# Patient Record
Sex: FEMALE | Race: WHITE | Employment: PART TIME | URBAN - METROPOLITAN AREA
[De-identification: names, ages, dates, MRNs, and addresses within clinical notes are randomized per-mention and may not be internally consistent; named-entity substitution may affect disease eponyms.]

---

## 2017-02-22 ENCOUNTER — TELEPHONE (OUTPATIENT)
Dept: ORTHOPEDIC SURGERY | Age: 57
End: 2017-02-22

## 2017-03-01 ENCOUNTER — OFFICE VISIT (OUTPATIENT)
Dept: ORTHOPEDIC SURGERY | Age: 57
End: 2017-03-01

## 2017-03-01 VITALS — BODY MASS INDEX: 29.57 KG/M2 | HEIGHT: 68 IN | WEIGHT: 195.11 LBS

## 2017-03-01 DIAGNOSIS — M17.12 PRIMARY OSTEOARTHRITIS OF LEFT KNEE: Primary | ICD-10-CM

## 2017-03-01 DIAGNOSIS — M25.562 PAIN, JOINT, KNEE, LEFT: ICD-10-CM

## 2017-03-01 PROCEDURE — 99203 OFFICE O/P NEW LOW 30 MIN: CPT | Performed by: FAMILY MEDICINE

## 2017-03-01 PROCEDURE — 20610 DRAIN/INJ JOINT/BURSA W/O US: CPT | Performed by: FAMILY MEDICINE

## 2017-03-01 RX ORDER — MELOXICAM 15 MG/1
15 TABLET ORAL DAILY
Qty: 30 TABLET | Refills: 3 | Status: SHIPPED | OUTPATIENT
Start: 2017-03-01 | End: 2017-10-19 | Stop reason: SDUPTHER

## 2017-03-06 ENCOUNTER — OFFICE VISIT (OUTPATIENT)
Dept: ORTHOPEDIC SURGERY | Age: 57
End: 2017-03-06

## 2017-03-06 VITALS — BODY MASS INDEX: 29.57 KG/M2 | WEIGHT: 195.11 LBS | HEIGHT: 68 IN

## 2017-03-06 DIAGNOSIS — M25.562 PAIN, JOINT, KNEE, LEFT: ICD-10-CM

## 2017-03-06 DIAGNOSIS — M17.12 PRIMARY OSTEOARTHRITIS OF LEFT KNEE: Primary | ICD-10-CM

## 2017-03-06 PROCEDURE — 20610 DRAIN/INJ JOINT/BURSA W/O US: CPT | Performed by: FAMILY MEDICINE

## 2017-03-20 ENCOUNTER — OFFICE VISIT (OUTPATIENT)
Dept: ORTHOPEDIC SURGERY | Age: 57
End: 2017-03-20

## 2017-03-20 VITALS — HEIGHT: 68 IN | WEIGHT: 195.11 LBS | BODY MASS INDEX: 29.57 KG/M2

## 2017-03-20 DIAGNOSIS — M17.12 PRIMARY OSTEOARTHRITIS OF LEFT KNEE: Primary | ICD-10-CM

## 2017-03-20 DIAGNOSIS — M25.562 PAIN, JOINT, KNEE, LEFT: ICD-10-CM

## 2017-03-20 PROCEDURE — 20610 DRAIN/INJ JOINT/BURSA W/O US: CPT | Performed by: FAMILY MEDICINE

## 2017-06-12 ENCOUNTER — OFFICE VISIT (OUTPATIENT)
Dept: ORTHOPEDIC SURGERY | Age: 57
End: 2017-06-12

## 2017-06-12 VITALS — HEIGHT: 68 IN | WEIGHT: 195.11 LBS | BODY MASS INDEX: 29.57 KG/M2

## 2017-06-12 DIAGNOSIS — M25.562 PAIN, JOINT, KNEE, LEFT: ICD-10-CM

## 2017-06-12 DIAGNOSIS — M17.11 PRIMARY OSTEOARTHRITIS OF RIGHT KNEE: ICD-10-CM

## 2017-06-12 DIAGNOSIS — M25.561 ACUTE PAIN OF RIGHT KNEE: Primary | ICD-10-CM

## 2017-06-12 PROBLEM — M17.12 PRIMARY OSTEOARTHRITIS OF LEFT KNEE: Chronic | Status: ACTIVE | Noted: 2017-03-01

## 2017-06-12 PROCEDURE — 73560 X-RAY EXAM OF KNEE 1 OR 2: CPT | Performed by: ORTHOPAEDIC SURGERY

## 2017-06-12 PROCEDURE — 99212 OFFICE O/P EST SF 10 MIN: CPT | Performed by: ORTHOPAEDIC SURGERY

## 2017-06-12 PROCEDURE — 20610 DRAIN/INJ JOINT/BURSA W/O US: CPT | Performed by: ORTHOPAEDIC SURGERY

## 2017-08-04 DIAGNOSIS — M17.11 PRIMARY OSTEOARTHRITIS OF RIGHT KNEE: Primary | Chronic | ICD-10-CM

## 2017-08-04 DIAGNOSIS — M17.12 PRIMARY OSTEOARTHRITIS OF LEFT KNEE: Chronic | ICD-10-CM

## 2017-08-07 ENCOUNTER — TELEPHONE (OUTPATIENT)
Dept: ORTHOPEDIC SURGERY | Age: 57
End: 2017-08-07

## 2017-08-08 ENCOUNTER — OFFICE VISIT (OUTPATIENT)
Dept: ORTHOPEDIC SURGERY | Age: 57
End: 2017-08-08

## 2017-08-08 DIAGNOSIS — M17.11 PRIMARY OSTEOARTHRITIS OF RIGHT KNEE: Chronic | ICD-10-CM

## 2017-08-08 DIAGNOSIS — M17.12 PRIMARY OSTEOARTHRITIS OF LEFT KNEE: Chronic | ICD-10-CM

## 2017-08-08 PROCEDURE — 20610 DRAIN/INJ JOINT/BURSA W/O US: CPT | Performed by: ORTHOPAEDIC SURGERY

## 2017-08-16 ENCOUNTER — OFFICE VISIT (OUTPATIENT)
Dept: ORTHOPEDIC SURGERY | Age: 57
End: 2017-08-16

## 2017-08-16 DIAGNOSIS — M17.0 PRIMARY OSTEOARTHRITIS OF BOTH KNEES: Primary | ICD-10-CM

## 2017-08-16 PROCEDURE — 20611 DRAIN/INJ JOINT/BURSA W/US: CPT | Performed by: PHYSICIAN ASSISTANT

## 2017-08-16 PROCEDURE — 99999 PR OFFICE/OUTPT VISIT,PROCEDURE ONLY: CPT | Performed by: PHYSICIAN ASSISTANT

## 2017-08-17 ENCOUNTER — HOSPITAL ENCOUNTER (OUTPATIENT)
Dept: PHYSICAL THERAPY | Age: 57
Discharge: HOME | End: 2017-08-31
Admitting: ORTHOPAEDIC SURGERY

## 2017-08-21 ENCOUNTER — OFFICE VISIT (OUTPATIENT)
Dept: ORTHOPEDIC SURGERY | Age: 57
End: 2017-08-21

## 2017-08-21 VITALS — HEIGHT: 68 IN | BODY MASS INDEX: 29.57 KG/M2 | WEIGHT: 195.11 LBS

## 2017-08-21 DIAGNOSIS — M17.12 PRIMARY OSTEOARTHRITIS OF LEFT KNEE: Chronic | ICD-10-CM

## 2017-08-21 DIAGNOSIS — M17.11 PRIMARY OSTEOARTHRITIS OF RIGHT KNEE: Primary | Chronic | ICD-10-CM

## 2017-08-21 PROCEDURE — 20610 DRAIN/INJ JOINT/BURSA W/O US: CPT | Performed by: ORTHOPAEDIC SURGERY

## 2017-08-24 ENCOUNTER — HOSPITAL ENCOUNTER (OUTPATIENT)
Dept: PHYSICAL THERAPY | Age: 57
Discharge: HOME | End: 2017-08-24
Admitting: ORTHOPAEDIC SURGERY

## 2017-08-30 ENCOUNTER — HOSPITAL ENCOUNTER (OUTPATIENT)
Dept: PHYSICAL THERAPY | Age: 57
Discharge: HOME | End: 2017-08-30
Admitting: ORTHOPAEDIC SURGERY

## 2017-10-19 DIAGNOSIS — M17.12 PRIMARY OSTEOARTHRITIS OF LEFT KNEE: ICD-10-CM

## 2017-10-19 DIAGNOSIS — M25.562 PAIN, JOINT, KNEE, LEFT: ICD-10-CM

## 2017-10-20 RX ORDER — MELOXICAM 15 MG/1
15 TABLET ORAL DAILY
Qty: 30 TABLET | Refills: 3 | Status: SHIPPED | OUTPATIENT
Start: 2017-10-20 | End: 2018-04-29 | Stop reason: SDUPTHER

## 2018-03-05 DIAGNOSIS — M17.11 PRIMARY OSTEOARTHRITIS OF RIGHT KNEE: Chronic | ICD-10-CM

## 2018-03-05 DIAGNOSIS — M17.12 PRIMARY OSTEOARTHRITIS OF LEFT KNEE: Chronic | ICD-10-CM

## 2018-03-12 ENCOUNTER — TELEPHONE (OUTPATIENT)
Dept: ORTHOPEDIC SURGERY | Age: 58
End: 2018-03-12

## 2018-03-20 ENCOUNTER — TELEPHONE (OUTPATIENT)
Dept: ORTHOPEDIC SURGERY | Age: 58
End: 2018-03-20

## 2018-03-28 ENCOUNTER — OFFICE VISIT (OUTPATIENT)
Dept: ORTHOPEDIC SURGERY | Age: 58
End: 2018-03-28

## 2018-03-28 VITALS
BODY MASS INDEX: 29.57 KG/M2 | HEIGHT: 68 IN | SYSTOLIC BLOOD PRESSURE: 144 MMHG | DIASTOLIC BLOOD PRESSURE: 90 MMHG | WEIGHT: 195.11 LBS | HEART RATE: 85 BPM

## 2018-03-28 DIAGNOSIS — M17.11 PRIMARY OSTEOARTHRITIS OF RIGHT KNEE: Primary | Chronic | ICD-10-CM

## 2018-03-28 DIAGNOSIS — M17.12 PRIMARY OSTEOARTHRITIS OF LEFT KNEE: Chronic | ICD-10-CM

## 2018-03-28 PROCEDURE — 20610 DRAIN/INJ JOINT/BURSA W/O US: CPT | Performed by: FAMILY MEDICINE

## 2018-03-28 PROCEDURE — 99213 OFFICE O/P EST LOW 20 MIN: CPT | Performed by: FAMILY MEDICINE

## 2018-04-02 ENCOUNTER — OFFICE VISIT (OUTPATIENT)
Dept: ORTHOPEDIC SURGERY | Age: 58
End: 2018-04-02

## 2018-04-02 DIAGNOSIS — M17.11 PRIMARY OSTEOARTHRITIS OF RIGHT KNEE: Chronic | ICD-10-CM

## 2018-04-02 DIAGNOSIS — M17.12 PRIMARY OSTEOARTHRITIS OF LEFT KNEE: Chronic | ICD-10-CM

## 2018-04-02 PROCEDURE — 20610 DRAIN/INJ JOINT/BURSA W/O US: CPT | Performed by: ORTHOPAEDIC SURGERY

## 2018-04-16 ENCOUNTER — OFFICE VISIT (OUTPATIENT)
Dept: ORTHOPEDIC SURGERY | Age: 58
End: 2018-04-16

## 2018-04-16 DIAGNOSIS — M17.12 PRIMARY OSTEOARTHRITIS OF LEFT KNEE: Chronic | ICD-10-CM

## 2018-04-16 DIAGNOSIS — M17.11 PRIMARY OSTEOARTHRITIS OF RIGHT KNEE: Primary | Chronic | ICD-10-CM

## 2018-04-16 PROCEDURE — 20610 DRAIN/INJ JOINT/BURSA W/O US: CPT | Performed by: ORTHOPAEDIC SURGERY

## 2018-04-29 DIAGNOSIS — M25.562 PAIN, JOINT, KNEE, LEFT: ICD-10-CM

## 2018-04-29 DIAGNOSIS — M17.12 PRIMARY OSTEOARTHRITIS OF LEFT KNEE: ICD-10-CM

## 2018-04-30 RX ORDER — MELOXICAM 15 MG/1
15 TABLET ORAL DAILY
Qty: 30 TABLET | Refills: 0 | Status: SHIPPED | OUTPATIENT
Start: 2018-04-30 | End: 2018-05-23 | Stop reason: SDUPTHER

## 2018-05-23 DIAGNOSIS — M25.562 PAIN, JOINT, KNEE, LEFT: ICD-10-CM

## 2018-05-23 DIAGNOSIS — M17.12 PRIMARY OSTEOARTHRITIS OF LEFT KNEE: ICD-10-CM

## 2018-05-23 RX ORDER — MELOXICAM 15 MG/1
15 TABLET ORAL DAILY
Qty: 30 TABLET | Refills: 0 | Status: SHIPPED | OUTPATIENT
Start: 2018-05-23 | End: 2018-06-19 | Stop reason: SDUPTHER

## 2018-06-19 DIAGNOSIS — M25.562 PAIN, JOINT, KNEE, LEFT: ICD-10-CM

## 2018-06-19 DIAGNOSIS — M17.12 PRIMARY OSTEOARTHRITIS OF LEFT KNEE: ICD-10-CM

## 2018-06-19 RX ORDER — MELOXICAM 15 MG/1
15 TABLET ORAL DAILY
Qty: 30 TABLET | Refills: 0 | Status: SHIPPED | OUTPATIENT
Start: 2018-06-19 | End: 2020-06-16 | Stop reason: ALTCHOICE

## 2018-10-08 ENCOUNTER — OFFICE VISIT (OUTPATIENT)
Dept: ORTHOPEDIC SURGERY | Age: 58
End: 2018-10-08
Payer: COMMERCIAL

## 2018-10-08 DIAGNOSIS — M70.41 PREPATELLAR BURSITIS, RIGHT KNEE: ICD-10-CM

## 2018-10-08 DIAGNOSIS — M17.11 PRIMARY OSTEOARTHRITIS OF RIGHT KNEE: Chronic | ICD-10-CM

## 2018-10-08 DIAGNOSIS — M25.561 RIGHT KNEE PAIN, UNSPECIFIED CHRONICITY: Primary | ICD-10-CM

## 2018-10-08 PROCEDURE — 99213 OFFICE O/P EST LOW 20 MIN: CPT | Performed by: ORTHOPAEDIC SURGERY

## 2018-10-08 RX ORDER — METHYLPREDNISOLONE 4 MG/1
TABLET ORAL
Qty: 1 KIT | Refills: 0 | Status: SHIPPED | OUTPATIENT
Start: 2018-10-08 | End: 2020-06-16 | Stop reason: ALTCHOICE

## 2018-10-08 RX ORDER — CEFUROXIME AXETIL 250 MG/1
250 TABLET ORAL 2 TIMES DAILY
Qty: 20 TABLET | Refills: 0 | Status: SHIPPED | OUTPATIENT
Start: 2018-10-08 | End: 2018-10-18

## 2019-04-09 ENCOUNTER — OFFICE VISIT (OUTPATIENT)
Dept: ORTHOPEDIC SURGERY | Age: 59
End: 2019-04-09
Payer: COMMERCIAL

## 2019-04-09 VITALS
SYSTOLIC BLOOD PRESSURE: 128 MMHG | DIASTOLIC BLOOD PRESSURE: 80 MMHG | BODY MASS INDEX: 29.57 KG/M2 | HEIGHT: 68 IN | WEIGHT: 195.11 LBS | HEART RATE: 78 BPM

## 2019-04-09 DIAGNOSIS — M23.204 DEGENERATIVE TEAR OF MEDIAL MENISCUS OF LEFT KNEE: Primary | ICD-10-CM

## 2019-04-09 DIAGNOSIS — M25.562 CHRONIC PAIN OF LEFT KNEE: ICD-10-CM

## 2019-04-09 DIAGNOSIS — G89.29 CHRONIC PAIN OF LEFT KNEE: ICD-10-CM

## 2019-04-09 DIAGNOSIS — M17.12 PRIMARY OSTEOARTHRITIS OF LEFT KNEE: ICD-10-CM

## 2019-04-09 PROCEDURE — 99243 OFF/OP CNSLTJ NEW/EST LOW 30: CPT | Performed by: PHYSICAL MEDICINE & REHABILITATION

## 2019-04-09 RX ORDER — TOLTERODINE 4 MG/1
CAPSULE, EXTENDED RELEASE ORAL
Refills: 12 | COMMUNITY
Start: 2019-02-19

## 2019-04-09 NOTE — PROGRESS NOTES
New Patient: SPINE    Referring Provider:  Vikram Eckert MD    CHIEF COMPLAINT:    Chief Complaint   Patient presents with    Knee Pain     NP PRP consult. HISTORY OF PRESENT ILLNESS:      · The patient is being sent at the request of Vikram Eckert MD in consultation as a new spine patient for left knee pain. The patient is a 63 y.o. female whom reports 6 years of pain. Dr. Mika Mcqueen did a meniscus repair in 2012. She has had pain since then. She has had PRP in September 2018 and this worked well for her pain for about 7 months. She describes the pain in the knee to be a stabbing pain. She describes a severity of the pain to be a 7/10. The duration of her pain is persistent and constant in the left knee. Most bending and activity aggravate her pain. This has limited her behavior. Rest is the only relief she gets for the pain. It was not the result of an injury.     Pain Assessment  Location of Pain: Knee  Location Modifiers: Left  Severity of Pain: 7  Quality of Pain: (stabbing)  Duration of Pain: Persistent  Frequency of Pain: Constant  Aggravating Factors: (most activity)  Limiting Behavior: Yes  Relieving Factors: Rest  Result of Injury: No  Work-Related Injury: No  Are there other pain locations you wish to document?: No      Associated signs and symptoms:   Neurogenic bowel or bladder symptoms:  no   Perceived weakness:  no   Difficulty walking:  no    Recent Imaging (within past one year)   Xrays: yes   MRI or CT of spine: no    Current/Past Treatment:   · Physical Therapy:  yes  · Chiropractic:  none  · Injection:  yes  · Medications:   NSAIDS:  none   Muscle relaxer:  none   Steriods:  none   Neuropathic medications:  none   Opioids:  none  · Previous surgery:  yes  · Previous surgical consult:  yes  · Other:  · Infection control-tested positive for staph   · Tested positive for MRSA in past 12 months:  No, in 2012   · Tested positive for MSSA \"staph infection\" in past 12 months: no  · Tested positive for VRE (Vancomycin Resistant Enterococci) in past 12 months:   no  · Currently on any antibiotics for an infection: no  · Anticoagulants:  · On a blood thinner:  no   · Any history of bleeding disorder: no   · MRI Contraindication: no   · Previous Pain Management: no        Past Medical History:   Past Medical History:   Diagnosis Date    HTN (hypertension)     Prepatellar bursitis, right knee 10/8/2018      Past Surgical History:     Past Surgical History:   Procedure Laterality Date    CARDIAC SURGERY      svt ablation    ENDOMETRIAL ABLATION      KNEE ARTHROSCOPY      SEPTOPLASTY       Current Medications:     Current Outpatient Medications:     tolterodine (DETROL LA) 4 MG extended release capsule, TK 1 C PO D, Disp: , Rfl: 12    lisinopril (PRINIVIL;ZESTRIL) 5 MG tablet, TK 1 T PO D, Disp: , Rfl: 2    methylPREDNISolone (MEDROL, HEDY,) 4 MG tablet, BURST THERAPY, Disp: 1 kit, Rfl: 0    meloxicam (MOBIC) 15 MG tablet, TAKE 1 TABLET BY MOUTH DAILY, Disp: 30 tablet, Rfl: 0    PROCTOZONE-HC 2.5 % rectal cream, PLACE REC BID, Disp: , Rfl: 0  Allergies:  Morphine  Social History:    reports that she has never smoked. She has never used smokeless tobacco.  Family History:   History reviewed. No pertinent family history. REVIEW OF SYSTEMS: Full ROS reviewed & scanned from 4/9/2019           PHYSICAL EXAM:    Vitals: Blood pressure 128/80, pulse 78, height 5' 8.11\" (1.73 m), weight 195 lb 1.7 oz (88.5 kg). GENERAL EXAM:  · General Apparence: Patient is adequately groomed with no evidence of malnutrition. · Psychiatric: Orientation: The patient is oriented to time, place and person. The patient's mood and affect are appropriate   · Vascular: Examination reveals no swelling and palpation reveals no tenderness in upper or lower extremities. Good capillary refill.    · The lymphatic examination of the neck, axillae and groin reveals all areas to be without enlargement or There are no rashes, ulcerations or lesions. Strength and tone are normal. No atrophy or abnormal movements are noted. · LEFT LOWER EXTREMITY:  Inspection/examination of the left lower extremity does show tenderness along the medial and lateral joint lines of the knee. There is no deformity or injury. Range of motion is limited due to pain. There is no gross instability. There are no rashes, ulcerations or lesions. Strength is diminished at a 4/5 with knee extension and flexion, but tone is normal. No atrophy or abnormal movements are noted. +McMurrays testing for medial meniscus      Diagnostic Testing:    Xrays: Three views (AP, lateral, and sunrise) of the left knee were obtained today in the office. show moderate to severe DJD    MRI or CT:  None  EMG:  None  No results found for this or any previous visit. Impression (Medical Decision Making):       1. Degenerative tear of medial meniscus of left knee    2. Primary osteoarthritis of left knee    3. Chronic pain of left knee        Plan (Medical Decision Making):    I discussed the diagnosis and the treatment options with Maris Davey today. In Summary:  The various treatment options were outlined and discussed with [facility] including:  Conservative care options: physical therapy, ice, medications, bracing, and activity modification. The indications for therapeutic injections. The indications for additional imaging/laboratory studies. The indications for (possible future) interventions. After considering the various options discussed, Maris Davey elected to pursue a course of treatment that includes the followin. Medications: No further recommendations for new medications. 2. PT:  Encouraged to continue with HEP. 3. Further studies:  MRI of the left knee. 4. Interventional:  Patient will be scheduled for PRP. When she follows up for the MRI of the left knee, we will proceed with PRP.   She'll be given a pre-and post

## 2019-04-10 ENCOUNTER — TELEPHONE (OUTPATIENT)
Dept: ORTHOPEDIC SURGERY | Age: 59
End: 2019-04-10

## 2019-04-10 NOTE — TELEPHONE ENCOUNTER
Spoke to patient regarding MRI Left knee approval and authorization being valid until 05/08/19. Patient was instructed to call [address] [place] [address] to schedule MRI Left knee, then contact our office for follow up appointment. MRI Left knee results will not be given over the phone. Patient is also to be scheduled for PRP left knee at follow up visit. Our office will contact the patient once MRI results are received to schedule appointment.

## 2019-04-15 ENCOUNTER — TELEPHONE (OUTPATIENT)
Dept: ORTHOPEDIC SURGERY | Age: 59
End: 2019-04-15

## 2019-04-15 NOTE — TELEPHONE ENCOUNTER
PT CALLED WANTING TO INFORM YOU THAT SHE HAD MRI DONE ON 4/13/19 @[facility].   [email or web address] [facility] [phone]

## 2019-04-18 ENCOUNTER — TELEPHONE (OUTPATIENT)
Dept: ORTHOPEDIC SURGERY | Age: 59
End: 2019-04-18

## 2019-04-18 NOTE — TELEPHONE ENCOUNTER
Patient scheduled for 4/30/19 @ 10:00 with an arrival time of 9:15 for blood draw. She notes understanding. She also notes stopping NSAIDs and is aware of time frame to stop ASA/fish oils. She was also instructed to hydrate the day prior and day of procedure. Pre-procedure instructions were discussed again with patient in detail. Rep notified who states he will bring centrifuge machine to [facility].

## 2019-04-30 ENCOUNTER — OFFICE VISIT (OUTPATIENT)
Dept: ORTHOPEDIC SURGERY | Age: 59
End: 2019-04-30
Payer: COMMERCIAL

## 2019-04-30 VITALS
BODY MASS INDEX: 29.57 KG/M2 | HEART RATE: 88 BPM | WEIGHT: 195.11 LBS | HEIGHT: 68 IN | DIASTOLIC BLOOD PRESSURE: 84 MMHG | SYSTOLIC BLOOD PRESSURE: 126 MMHG

## 2019-04-30 DIAGNOSIS — M17.12 PRIMARY OSTEOARTHRITIS OF LEFT KNEE: Primary | ICD-10-CM

## 2019-04-30 PROCEDURE — 0232T NJX PLATELET PLASMA: CPT | Performed by: PHYSICAL MEDICINE & REHABILITATION

## 2019-04-30 NOTE — PROGRESS NOTES
Procedure:  Left knee intra-articular injection with PRP     Pre-Procedure Diagnosis: OA of the left knee     Post-Procedure Diagnosis: Same     Sedation: None     EBL: None     Complications: None     Procedure Summary:     The patient was seen in the office for complaints of left knee pain and gluteal pain.  Review of the imaging and physical exam of the patient confirmed the pre-procedure diagnosis.  After a thorough discussion of risks, benefits and alternatives informed consent was obtained.     Prior to the start of the procedure, 50 ml of blood draw was taken from the patient. This was centrifuged with the assistance of an [employer] representative to 3 ml of Leukocyte Poor Platelets and 6 ml of PPP was added.     The patient was placed in a seated position. The skin overlying the left inferolateral position of the knee was prepped with ChloraPrep ×3. A mixture of the poor plasma and platelet Rich plasma was instilled for total of 10 ml with a 22 gauge needle. The Needle was removed and a Band-Aid was applied.     The patient was transferred to the post-operative area in stable condition.

## 2019-05-14 ENCOUNTER — OFFICE VISIT (OUTPATIENT)
Dept: ORTHOPEDIC SURGERY | Age: 59
End: 2019-05-14
Payer: COMMERCIAL

## 2019-05-14 VITALS — BODY MASS INDEX: 29.57 KG/M2 | HEIGHT: 68 IN | WEIGHT: 195.11 LBS

## 2019-05-14 DIAGNOSIS — M17.12 PRIMARY OSTEOARTHRITIS OF LEFT KNEE: Primary | ICD-10-CM

## 2019-05-14 PROCEDURE — 99213 OFFICE O/P EST LOW 20 MIN: CPT | Performed by: PHYSICAL MEDICINE & REHABILITATION

## 2019-05-14 NOTE — PROGRESS NOTES
Follow up: Uriel Gamez  1960  [id number]      CHIEF COMPLAINT:    Chief Complaint   Patient presents with    Knee Pain     fua L knee. PRP 4/30/2019. HISTORY OF PRESENT ILLNESS:  Ms. Abigail House is a 63 y.o. female returns for a follow up visit for multiple medical problems. Her current presenting problems are   1. Primary osteoarthritis of left knee    . As per information/history obtained from the PADT(patient assessment and documentation tool) - She complains of pain in the knees Left with radiation to the knees Left She rates the pain 5/10 and describes it as stabbing, pinching. Pain is made worse by: being on feet a lot. She denies side effects from the current pain regimen. Patient reports that since the last follow up visit the physical functioning is unchanged, family/social relationships are unchanged, mood is unchanged and sleep patterns are unchanged, and that the overall functioning is unchanged. Patient denies neurological bowel or bladder. Patient underwent a PRP injection to the left knee on 4/30/2019. She states that she has been on her feet a lot with work lately. She also continues to have a pinching feeling her anterior and posterior knee. The pain does not wake her up at night. Her pain is now intermittent rather than constant as it was before. She also wishes to discuss her MRI images today as well. Patient denies any new injuries or triggers at this time. She is not ambulating with any assistive devices in the office today.      Associated signs and symptoms:   Neurogenic bowel or bladder symptoms:  no   Perceived weakness:  no   Difficulty walking:  yes              Past Medical History:   Past Medical History:   Diagnosis Date    HTN (hypertension)     Prepatellar bursitis, right knee 10/8/2018      Past Surgical History:     Past Surgical History:   Procedure Laterality Date    CARDIAC SURGERY      svt ablation    ENDOMETRIAL ABLATION      KNEE ARTHROSCOPY  SEPTOPLASTY       Current Medications:     Current Outpatient Medications:     tolterodine (DETROL LA) 4 MG extended release capsule, TK 1 C PO D, Disp: , Rfl: 12    methylPREDNISolone (MEDROL, HEDY,) 4 MG tablet, BURST THERAPY, Disp: 1 kit, Rfl: 0    meloxicam (MOBIC) 15 MG tablet, TAKE 1 TABLET BY MOUTH DAILY, Disp: 30 tablet, Rfl: 0    lisinopril (PRINIVIL;ZESTRIL) 5 MG tablet, TK 1 T PO D, Disp: , Rfl: 2    PROCTOZONE-HC 2.5 % rectal cream, PLACE REC BID, Disp: , Rfl: 0  Allergies:  Morphine  Social History:    reports that she has never smoked. She has never used smokeless tobacco.  Family History:   No family history on file. REVIEW OF SYSTEMS:   CONSTITUTIONAL: Denies unexplained weight loss, fevers, chills or fatigue  NEUROLOGICAL: Denies unsteady gait or progressive weakness  MUSCULOSKELETAL: Denies joint swelling or redness  GI: Denies nausea, vomiting, diarrhea   : Denies bowel or bladder issues       PHYSICAL EXAM:    Vitals: Height 5' 8.11\" (1.73 m), weight 195 lb 1.7 oz (88.5 kg). GENERAL EXAM:  · General Apparence: Patient is adequately groomed with no evidence of malnutrition. · Psychiatric: Orientation: The patient is oriented to time, place and person. The patient's mood and affect are appropriate   · Vascular: Examination reveals no swelling and palpation reveals no tenderness in upper or lower extremities. Good capillary refill. · The lymphatic examination of the neck, axillae and groin reveals all areas to be without enlargement or induration  · Sensation is intact without deficit in the upper and lower extremities to light touch and pinprick  · Coordination of the upper and lower extremities are normal.    LUMBAR/SACRAL EXAMINATION:  · Inspection: Local inspection shows no step-off or bruising. Lumbar alignment is normal. No instability is noted. · Palpation:   No evidence of tenderness at the midline.   Lumbar paraspinal tenderness: No tenderness to palpation of the lumbar paraspinals  Bursal tenderness No tenderness bilaterally  There is no paraspinal spasm. · Range of Motion: pain-free ROM  · Strength:   Strength testing is 5/5 in all muscle groups tested. · Special Tests:   Straight leg raise and crossed SLR negative. Andrew's testing is negative bilaterally. FADIR's testing is negative bilaterally. · Skin: There are no rashes, ulcerations or lesions. · Reflexes: Reflexes are symmetrically 2+ at the patellar and ankle tendons. Clonus absent bilaterally at the feet. · Gait & station: normal, patient ambulates without assistance  · Additional Examinations:  · RIGHT LOWER EXTREMITY: Inspection/examination of the right lower extremity does not show any tenderness, deformity or injury. Range of motion is normal and pain-free. There is no gross instability. There are no rashes, ulcerations or lesions. Strength and tone are normal. No atrophy or abnormal movements are noted. · LEFT LOWER EXTREMITY:  Inspection/examination of the left lower extremity does not show any tenderness, deformity or injury. Range of motion is normal and pain-free. There is no gross instability. There are no rashes, ulcerations or lesions. Strength and tone are normal. No atrophy or abnormal movements are noted. Diagnostic Testing:    MR Left knee -    1. Tricompartmental chondromalacia and degenerative arthrosis that is most evident involving    the medial compartment. 2. Partial medial meniscectomy without evidence for tear of the meniscal remnant. 3. A moderate-sized knee joint effusion with synovitis and mildly distended    semimembranosus-gastrocnemius bursa are identified. 4. Slight reactive osteoedema at the medial aspect lateral femoral condyle due to the adjacent    synovitis of the cruciate sheath. No results found for this or any previous visit. Impression:       1.  Primary osteoarthritis of left knee        Plan:  Clinical Course: Above diagnoses are improving     I discussed the diagnosis and the treatment options with Glenn Blackwell today. In Summary:  The various treatment options were outlined and discussed with [facility] including:  Conservative care options: physical therapy, ice, medications, bracing, and activity modification. The indications for therapeutic injections. The indications for additional imaging/laboratory studies. The indications for (possible future) interventions. After considering the various options discussed, Glenn Blackwell elected to pursue a course of treatment that includes the followin. Medications:  No further recommendations for new medications. 2. PT:  Start PT    3. Further studies:  No further studies. 4. Interventional:  PRP follow up - improving    5. Follow up:  4-6 weeks      Glenn Blackwell was instructed to call the office if her symptoms worsen or if new symptoms appear prior to the next scheduled visit. She is specifically instructed to contact the office between now & her scheduled appointment if she has concerns related to her condition or if she needs assistance in scheduling the above tests. She is welcome to call for an appointment sooner if she has any additional concerns or questions. Kassidy Gonzalez ATC, am scribing for and in the presence of Dr. Itzel Brown. 19 10:17 AM TERESITA Alves. Charisse Sun MD, ROBERT, [facility]  Board Certified in [address] [place] [address] Certified and Fellowship Trained in [facility] ([facility])             This dictation was performed with a verbal recognition program [facility]) and it was checked for errors. It is possible that there are still dictated errors within this office note. If so, please bring any errors to my attention for an addendum. All efforts were made to ensure that this office note is accurate.

## 2019-06-12 ENCOUNTER — HOSPITAL ENCOUNTER (OUTPATIENT)
Dept: PHYSICAL THERAPY | Age: 59
Setting detail: THERAPIES SERIES
Discharge: HOME | End: 2019-06-12
Payer: COMMERCIAL

## 2019-06-12 PROCEDURE — 97161 PT EVAL LOW COMPLEX 20 MIN: CPT | Performed by: PHYSICAL THERAPIST

## 2019-06-12 PROCEDURE — 97110 THERAPEUTIC EXERCISES: CPT | Performed by: PHYSICAL THERAPIST

## 2019-06-12 NOTE — FLOWSHEET NOTE
[facility], [address] [place] [address]  Phone: [phone]  Fax [phone]    Physical Therapy Daily Treatment Note  Date:  2019    Patient Name:  Cedric Cruz    :  1960  MRN: [id number]  Restrictions/Precautions:    Medical/Treatment Diagnosis Information:  Diagnosis: L knee OA M17.12  Treatment Diagnosis: L knee pain T56.183   Insurance/Certification information:  PT Insurance Information: [facility]  Physician Information:  Referring Practitioner: Dr. Anderson Level of care signed (Y/N): sent; poc expires 19    Date of Patient follow up with Physician:     G-Code (if applicable):      Date G-Code Applied:  19 LEFS       Progress Note: [x]  Yes  []  No  Next due by: Visit #10       Latex Allergy:  [x]NO      []YES  Preferred Language for Healthcare:   [x]English       []other:    Visit # Insurance Allowable Requires auth   1 40    [x]no        []yes:       Pain level:  2-4/10 L knee     SUBJECTIVE:  See eval    OBJECTIVE: See eval  Observation:   Test measurements:      RESTRICTIONS/PRECAUTIONS: OA B knees; hx of L knee scope     Exercises/Interventions:     Therapeutic Ex Sets/sec Reps Notes   Prone quad stretch with belt  15\"x6   Hep    Supine ITB stretch with belt  20\"x4   Hep    SL hip circles  5x5   Hep    Supine QS  SLR with ER  3\" 2x10   Hep    See ATC sheet    Started 19    edu diagnosis, body mechanics, patella precautions  5 min                                    Manual Intervention                                          NMR re-education                                              Therapeutic Exercise and NMR EXR  [] (25796) Provided verbal/tactile cueing for activities related to strengthening, flexibility, endurance, ROM for improvements in LE, proximal hip, and core control with self care, mobility, lifting, ambulation.  [] (86440) Provided verbal/tactile cueing for activities related to improving balance, coordination, kinesthetic sense, posture, motor skill, proprioception  to assist with LE, proximal hip, and core control in self care, mobility, lifting, ambulation and eccentric single leg control. NMR and Therapeutic Activities:    [] (41341 or 21306) Provided verbal/tactile cueing for activities related to improving balance, coordination, kinesthetic sense, posture, motor skill, proprioception and motor activation to allow for proper function of core, proximal hip and LE with self care and ADLs  [] (41221) Gait Re-education- Provided training and instruction to the patient for proper LE, core and proximal hip recruitment and positioning and eccentric body weight control with ambulation re-education including up and down stairs     Home Exercise Program:    [x] (25073) Reviewed/Progressed HEP activities related to strengthening, flexibility, endurance, ROM of core, proximal hip and LE for functional self-care, mobility, lifting and ambulation/stair navigation   [] (88613)Reviewed/Progressed HEP activities related to improving balance, coordination, kinesthetic sense, posture, motor skill, proprioception of core, proximal hip and LE for self care, mobility, lifting, and ambulation/stair navigation      Manual Treatments:  PROM / STM / Oscillations-Mobs:  G-I, II, III, IV (PA's, Inf., Post.)  [] (44083) Provided manual therapy to mobilize LE, proximal hip and/or LS spine soft tissue/joints for the purpose of modulating pain, promoting relaxation,  increasing ROM, reducing/eliminating soft tissue swelling/inflammation/restriction, improving soft tissue extensibility and allowing for proper ROM for normal function with self care, mobility, lifting and ambulation.      Modalities:  None this date     Charges:  Timed Code Treatment Minutes: 25   Total Treatment Minutes: 50     [x] EVAL (LOW) 60330 (typically 20 minutes face-to-face)  [] EVAL (MOD) 62451 (typically 30 minutes face-to-face)  [] EVAL (HIGH) 19051 (typically 45 minutes face-to-face)  [] RE-EVAL     [x] BA(94225) x  2   [] IONTO  [] NMR (37779) x      [] VASO  [] Manual (58669) x       [] Other:  [] TA x       [] Mech Traction (47200)  [] ES(attended) (58721)      [] ES (un) (15362):     GOALS:   Therapist goals for Patient:   Short Term Goals: To be achieved in: 2 weeks  1. Independent in HEP and progression per patient tolerance, in order to prevent re-injury. 2. Patient will have a decrease in pain to facilitate improvement in movement, function, and ADLs as indicated by Functional Deficits. Long Term Goals: To be achieved in: 4-6 weeks  1. Disability index score of 20% or less for the LEFS to assist with reaching prior level of function. 2. Patient will demonstrate increased AROM to L knee ext to 0 and flex to 125  to allow for proper joint functioning as indicated by patients Functional Deficits. 3. Patient will demonstrate an increase in Strength to good proximal hip strength and control, within 5lb HHD in LE to allow for proper functional mobility as indicated by patients Functional Deficits. 4. Patient will return to being able to ambulate and  ascend and descend stairs  without increased symptoms or restriction. 5. Pt able to return to walking program       Progression Towards Functional goals:  [] Patient is progressing as expected towards functional goals listed. [] Progression is slowed due to complexities listed. [] Progression has been slowed due to co-morbidities.   [x] Plan just implemented, too soon to assess goals progression  [] Other:     ASSESSMENT:  See eval    Treatment/Activity Tolerance:  [x] Patient tolerated treatment well [] Patient limited by fatique  [] Patient limited by pain  [] Patient limited by other medical complications  [] Other:     Prognosis: [x] Good [] Fair  [] Poor    Patient Requires Follow-up: [x] Yes  [] No    PLAN: See eval  [] Continue per plan of care [] Alter current plan (see comments)  [x] Plan of care initiated [] Hold pending MD visit [] Discharge    Electronically signed by: Natalie Benavides, [id number]

## 2019-06-12 NOTE — FLOWSHEET NOTE
[facility], [address] [place]  Phone: [phone]  Fax [phone]      ATHLETIC TRAINING [address]  Date:  2019    Patient Name:  Cedric Cruz    :  1960  MRN: [id number]  Restrictions/Precautions:    Medical/Treatment Diagnosis Information:  · Diagnosis: L knee OA M17.12  ·    Physician Information:  Referring Practitioner: Dr. Neville Curry Post-op  8 wks  12 wks 16 wks 20 wks   24 wks                            Activity Log                                                  DOS/DOI:                                                    Date:  19   ATC communication IH WO made   Bike    Elliptical    Treadmill    Airdyne        Gastroc stretch    Soleus stretch    Hamstring stretch    ITB stretch    Hip Flexor stretch    Quad stretch    Adductor stretch        Weight Shifting sp                              fp                              tp    Lateral walking (with/w/o TB)        Balance: PEP/Natasha board                   SLS          Star excursion load/explode          Extremity reach UE/LE        Leg Press Michel. 80# 2x10                     Ecc.                      Inv. Calf Press Michel. 80# 2x10                      Ecc.                        Inv.        ROYAL   Flex               ABd 45# R/L 2x10              ADd              TKE 60# R/L 20x5\"              Ext 45# R/L 2x10       Steps Up               Up and Over               Down               Lateral               Rotation        Squats  mini                  wall                 [facility]         Lunges:  Lunge to Balance                   Balance to Lunge                   Walking        Knee Extension Bilat. Ecc.                               Inv. Hamstring Curls Bilat. Ecc.                               Inv.        Soleus Press Bilat. Ecc.                           Inv.                             Ladders                Square               Jump/Hop  Low                      Med.                      High                                                            Modality Declined   Initials                             EP   Time spent one on one (workers comp)    Time spent with PT assistant

## 2019-06-12 NOTE — PLAN OF CARE
[facility], [address]  Phone: [phone]  Fax [phone]     Physical Therapy Certification    Dear Referring Practitioner: Dr. Simon Salazar,    We had the pleasure of evaluating the following patient for physical therapy services at [address] [place] [address]. A summary of our findings can be found in the initial assessment below. This includes our plan of care. If you have any questions or concerns regarding these findings, please do not hesitate to contact me at the office phone number checked above. Thank you for the referral.       Physician Signature:_______________________________Date:__________________  By signing above (or electronic signature), therapists plan is approved by physician    Patient: [place] Guernsey   : 1960   MRN: [id number]  Referring Physician: Referring Practitioner: Dr. Simon Salazar      Evaluation Date: 2019      Medical Diagnosis Information:  Diagnosis: L knee OA M17.12   Treatment Diagnosis: L knee pain M25.562                                          Insurance information: PT Insurance Information: BCBS       Precautions/ Contra-indications: OA B knees, hx of L knee scope   Latex Allergy:  [x]NO      []YES  Preferred Language for Healthcare:   [x]English       []other:    SUBJECTIVE: Patient reports she has been getting PRP injections in her L knee. It lasted 1 yr and then she was just injected again last month. Pt reports she is still having pain in her L knee and it gets stiff at work. Pt reports she has a knee brace but does not wear it. Pt reports she was taking meloxicam but has not been taking since injection.  Pt reports she is going on vacation on cruise to [place] next Wednesday      Relevant Medical History: OA B knees   Functional Disability Index: LEFS 31%    Height 5 ft 6 inches  Weight 190   Pain Scale: 2-4/10 L knee   Easing factors: stretching, heat   Provocative factors: being on feet all day and up and down steps, squatting     Type: [x]Constant   []Intermittent  []Radiating []Localized []other:     Numbness/Tingling: pt denies     Occupation/School: [employer] 3x week     Living Status/Prior Level of Function: Independent with ADLs and IADLs, lives with  in 2 story home with bedroom and bathroom upstairs. Was walking for exercise for 30 min and biking     OBJECTIVE:     ROM LEFT RIGHT   HIP Flex     HIP Abd     HIP Ext -3 0   HIP IR wnl's  wnl's   HIP ER wnl's  wnl's    Knee ext -10 0   Knee Flex 124 130   Ankle PF     Ankle DF     Ankle In     Ankle Ev     Strength  LEFT RIGHT   HIP Flexors 4 4-   HIP Abductors 4- 3+   HIP Ext 4- 3+   Hip ER     Knee EXT (quad) 4- 4-   Knee Flex (HS) 4 4   Ankle DF 5 5   Ankle PF     Ankle Inv     Ankle EV          Circumference  Mid apex  7 cm prox             Reflexes/Sensation: NT    []Dermatomes/Myotomes intact    [x]Reflexes equal and normal bilaterally   []Other:    Joint mobility: L knee    []Normal    [x]Hypo   []Hyper    Palpation: tender with palp to post knee, medial patella     Functional Mobility/Transfers: independent     Posture: genu valgus R knee; pes planus R>L     Bandages/Dressings/Incisions: NA     Gait:slight out toeing R leg     Orthopedic Special Tests: + patella grind                        [x] Patient history, allergies, meds reviewed. Medical chart reviewed. See intake form. Review Of Systems (ROS):  [x]Performed Review of systems (Integumentary, CardioPulmonary, Neurological) by intake and observation. Intake form has been scanned into medical record. Patient has been instructed to contact their primary care physician regarding ROS issues if not already being addressed at this time.       Co-morbidities/Complexities (which will affect course of rehabilitation):   []None           Arthritic conditions   []Rheumatoid arthritis (M05.9)  [x]Osteoarthritis (M19.91)   Cardiovascular conditions   []Hypertension (I10)  []Hyperlipidemia (E78.5)  []Angina pectoris (I20)  []Atherosclerosis (I70)   Musculoskeletal conditions   []Disc pathology   []Congenital spine pathologies   []Prior surgical intervention  []Osteoporosis (M81.8)  []Osteopenia (M85.8)   Endocrine conditions   []Hypothyroid (E03.9)  []Hyperthyroid Gastrointestinal conditions   []Constipation (M29.25)   Metabolic conditions   []Morbid obesity (E66.01)  []Diabetes type 1(E10.65) or 2 (E11.65)   []Neuropathy (G60.9)     Pulmonary conditions   []Asthma (J45)  []Coughing   []COPD (J44.9)   Psychological Disorders  []Anxiety (F41.9)  []Depression (F32.9)   []Other:   [x]Other:  Hx of L knee scope         Barriers to/and or personal factors that will affect rehab potential:              []Age  []Sex              []Motivation/Lack of Motivation                        []Co-Morbidities              []Cognitive Function, education/learning barriers              []Environmental, home barriers              []profession/work barriers  []past PT/medical experience  []other:  Justification:     Falls Risk Assessment (30 days):   [x] Falls Risk assessed and no intervention required.   [] Falls Risk assessed and Patient requires intervention due to being higher risk   TUG score (>12s at risk):     [] Falls education provided, including       G-Codes:       ASSESSMENT:   Functional Impairments:     [x]Noted lumbar/proximal hip/LE joint hypomobility   [x]Decreased LE functional ROM   [x]Decreased core/proximal hip strength and neuromuscular control   [x]Decreased LE functional strength   [x]Reduced balance/proprioceptive control   []other:      Functional Activity Limitations (from functional questionnaire and intake)   [x]Reduced ability to tolerate prolonged functional positions   [x]Reduced ability or difficulty with changes of positions or transfers between positions   [x]Reduced ability to maintain good posture and demonstrate good body mechanics with sitting, bending, and lifting   []Reduced ability to sleep   [] Reduced ability or tolerance with driving and/or computer work   [x]Reduced ability to perform lifting, carrying tasks   [x]Reduced ability to squat   []Reduced ability to forward bend   [x]Reduced ability to ambulate prolonged functional periods/distances/surfaces   [x]Reduced ability to ascend/descend stairs   []Reduced ability to run, hop, cut or jump   []other:    Participation Restrictions   [x]Reduced participation in self care activities   [x]Reduced participation in home management activities   [x]Reduced participation in work activities   [x]Reduced participation in social activities. [x]Reduced participation in sport/recreation activities. Classification :    []Signs/symptoms consistent with post-surgical status including decreased ROM, strength and function.    []Signs/symptoms consistent with joint sprain/strain   []Signs/symptoms consistent with patella-femoral syndrome   [x]Signs/symptoms consistent with knee OA/hip OA   []Signs/symptoms consistent with internal derangement of knee/Hip   []Signs/symptoms consistent with functional hip weakness/NMR control      []Signs/symptoms consistent with tendinitis/tendinosis    []signs/symptoms consistent with pathology which may benefit from Dry needling      []other:      Prognosis/Rehab Potential:      []Excellent   [x]Good    []Fair   []Poor    Tolerance of evaluation/treatment:    []Excellent   [x]Good    []Fair   []Poor  Physical Therapy Evaluation Complexity Justification  [x] A history of present problem with:  [] no personal factors and/or comorbidities that impact the plan of care;  [x]1-2 personal factors and/or comorbidities that impact the plan of care  []3 personal factors and/or comorbidities that impact the plan of care  [x] An examination of body systems using standardized tests and measures addressing any of the following: body structures and functions (impairments), activity limitations, and/or participation restrictions;:  [x] a total of 1-2 or more elements   [] a total of 3 or more elements   [] a total of 4 or more elements   [x] A clinical presentation with:  [x] stable and/or uncomplicated characteristics   [] evolving clinical presentation with changing characteristics  [] unstable and unpredictable characteristics;   [x] Clinical decision making of [x] low, [] moderate, [] high complexity using standardized patient assessment instrument and/or measurable assessment of functional outcome. [x] EVAL (LOW) 58861 (typically 20 minutes face-to-face)  [] EVAL (MOD) 79937 (typically 30 minutes face-to-face)  [] EVAL (HIGH) 60666 (typically 45 minutes face-to-face)  [] RE-EVAL       PLAN:   Frequency/Duration:  1 days per week for 4-6 Weeks:  Interventions:  [x]  Therapeutic exercise including: strength training, ROM, for Lower extremity and core   [x]  NMR activation and proprioception for LE, Glutes and Core   [x]  Manual therapy as indicated for LE, Hip and spine to include: Dry Needling/IASTM, STM, PROM, Gr I-IV mobilizations, manipulation. [x] Modalities as needed that may include: thermal agents, E-stim, Biofeedback, US, iontophoresis as indicated  [x] Patient education on joint protection, postural re-education, activity modification, progression of HEP. HEP instruction:pt given written HEP     GOALS:  Patient stated goal: increase strength in LE's     Therapist goals for Patient:   Short Term Goals: To be achieved in: 2 weeks  1. Independent in HEP and progression per patient tolerance, in order to prevent re-injury. 2. Patient will have a decrease in pain to facilitate improvement in movement, function, and ADLs as indicated by Functional Deficits. Long Term Goals: To be achieved in: 4-6 weeks  1. Disability index score of 20% or less for the LEFS to assist with reaching prior level of function.    2. Patient will demonstrate increased AROM to L knee ext to 0 and flex to 125  to allow for proper joint functioning as indicated by patients Functional Deficits. 3. Patient will demonstrate an increase in Strength to good proximal hip strength and control, within 5lb HHD in LE to allow for proper functional mobility as indicated by patients Functional Deficits. 4. Patient will return to being able to ambulate and  ascend and descend stairs  without increased symptoms or restriction.    5. Pt able to return to walking program        Electronically signed by:  Luis Crenshaw PT

## 2019-07-03 ENCOUNTER — HOSPITAL ENCOUNTER (OUTPATIENT)
Dept: PHYSICAL THERAPY | Age: 59
Setting detail: THERAPIES SERIES
End: 2019-07-03
Payer: COMMERCIAL

## 2019-07-10 ENCOUNTER — HOSPITAL ENCOUNTER (OUTPATIENT)
Dept: PHYSICAL THERAPY | Age: 59
Setting detail: THERAPIES SERIES
Discharge: HOME | End: 2019-07-10
Payer: COMMERCIAL

## 2019-07-10 PROCEDURE — 97110 THERAPEUTIC EXERCISES: CPT | Performed by: PHYSICAL THERAPIST

## 2019-07-10 PROCEDURE — 97140 MANUAL THERAPY 1/> REGIONS: CPT | Performed by: PHYSICAL THERAPIST

## 2019-07-10 NOTE — FLOWSHEET NOTE
Ecc.                                Inv.          Soleus Press Bilat.                             Ecc.                            Inv.          CC  10# Hip abd R/L 20x    10# Hip ext R/L 20x                                  Ladders                 Square                Jump/Hop  Low                       Med.                       High                                                               Modality Declined Declined   Initials                             EP EP   Time spent one on one (workers comp)     Time spent with PT assistant

## 2019-07-17 ENCOUNTER — HOSPITAL ENCOUNTER (OUTPATIENT)
Dept: PHYSICAL THERAPY | Age: 59
Setting detail: THERAPIES SERIES
Discharge: HOME | End: 2019-07-17
Payer: COMMERCIAL

## 2019-07-17 PROCEDURE — 97110 THERAPEUTIC EXERCISES: CPT | Performed by: PHYSICAL THERAPIST

## 2019-07-17 PROCEDURE — 97140 MANUAL THERAPY 1/> REGIONS: CPT | Performed by: PHYSICAL THERAPIST

## 2019-07-17 NOTE — FLOWSHEET NOTE
Walking            Knee Extension Bilat. Ecc.                                 Inv. Hamstring Curls Bilat. 30# 3x10 IH 30# 3x10                              Ecc.                                 Inv.            Soleus Press Bilat.    IH 30# 3x10                          Ecc.                             Inv.            CC  10# Hip abd R/L 20x    10# Hip ext R/L 20x                                      Ladders                  Square                 Jump/Hop  Low                        Med.                        High                                                                  Modality Declined Declined Declined   Initials                             EP EP DB   Time spent one on one (workers comp)      Time spent with PT assistant
tissue/joints for the purpose of modulating pain, promoting relaxation,  increasing ROM, reducing/eliminating soft tissue swelling/inflammation/restriction, improving soft tissue extensibility and allowing for proper ROM for normal function with self care, mobility, lifting and ambulation. Modalities:  None this date     Charges:  Timed Code Treatment Minutes: 40   Total Treatment Minutes: 40     [] EVAL (LOW) 37765 (typically 20 minutes face-to-face)  [] EVAL (MOD) 48476 (typically 30 minutes face-to-face)  [] EVAL (HIGH) 66157 (typically 45 minutes face-to-face)  [] RE-EVAL     [x] TD(56074) x  2   [] IONTO  [] NMR (80266) x      [] VASO  [x] Manual (49655) x  1    [] Other:  [] TA x       [] Mech Traction (78903)  [] ES(attended) (62156)      [] ES (un) (35259):     GOALS:   Therapist goals for Patient:   Short Term Goals: To be achieved in: 2 weeks  1. Independent in HEP and progression per patient tolerance, in order to prevent re-injury. 2. Patient will have a decrease in pain to facilitate improvement in movement, function, and ADLs as indicated by Functional Deficits. Long Term Goals: To be achieved in: 4-6 weeks  1. Disability index score of 20% or less for the LEFS to assist with reaching prior level of function. 2. Patient will demonstrate increased AROM to L knee ext to 0 and flex to 125  to allow for proper joint functioning as indicated by patients Functional Deficits. 3. Patient will demonstrate an increase in Strength to good proximal hip strength and control, within 5lb HHD in LE to allow for proper functional mobility as indicated by patients Functional Deficits. 4. Patient will return to being able to ambulate and  ascend and descend stairs  without increased symptoms or restriction. 5. Pt able to return to walking program       Progression Towards Functional goals:  [] Patient is progressing as expected towards functional goals listed.     [] Progression is slowed due to

## 2019-07-24 ENCOUNTER — HOSPITAL ENCOUNTER (OUTPATIENT)
Dept: PHYSICAL THERAPY | Age: 59
Setting detail: THERAPIES SERIES
Discharge: HOME | End: 2019-07-24
Payer: COMMERCIAL

## 2019-07-24 PROCEDURE — 97110 THERAPEUTIC EXERCISES: CPT | Performed by: PHYSICAL THERAPIST

## 2019-07-24 PROCEDURE — 97140 MANUAL THERAPY 1/> REGIONS: CPT | Performed by: PHYSICAL THERAPIST

## 2019-07-24 NOTE — FLOWSHEET NOTE
NMR re-education      Post disc slides  2x10      abd disc slides  2x10      LSD  4\" to 2\" had to stop secondary to medial knee pain                            Therapeutic Exercise and NMR EXR  [x] (73985) Provided verbal/tactile cueing for activities related to strengthening, flexibility, endurance, ROM for improvements in LE, proximal hip, and core control with self care, mobility, lifting, ambulation.  [] (27864) Provided verbal/tactile cueing for activities related to improving balance, coordination, kinesthetic sense, posture, motor skill, proprioception  to assist with LE, proximal hip, and core control in self care, mobility, lifting, ambulation and eccentric single leg control.      NMR and Therapeutic Activities:    [x] (68524 or 31059) Provided verbal/tactile cueing for activities related to improving balance, coordination, kinesthetic sense, posture, motor skill, proprioception and motor activation to allow for proper function of core, proximal hip and LE with self care and ADLs  [] (82991) Gait Re-education- Provided training and instruction to the patient for proper LE, core and proximal hip recruitment and positioning and eccentric body weight control with ambulation re-education including up and down stairs     Home Exercise Program:    [x] (91964) Reviewed/Progressed HEP activities related to strengthening, flexibility, endurance, ROM of core, proximal hip and LE for functional self-care, mobility, lifting and ambulation/stair navigation   [] (51156)Reviewed/Progressed HEP activities related to improving balance, coordination, kinesthetic sense, posture, motor skill, proprioception of core, proximal hip and LE for self care, mobility, lifting, and ambulation/stair navigation      Manual Treatments:  PROM / STM / Oscillations-Mobs:  G-I, II, III, IV (PA's, Inf., Post.)  [x] (24628) Provided manual therapy to mobilize LE, proximal hip and/or LS spine soft tissue/joints for the purpose of modulating pain, promoting relaxation,  increasing ROM, reducing/eliminating soft tissue swelling/inflammation/restriction, improving soft tissue extensibility and allowing for proper ROM for normal function with self care, mobility, lifting and ambulation. Modalities:  None this date     Charges:  Timed Code Treatment Minutes: 40   Total Treatment Minutes: 40     [] EVAL (LOW) 93554 (typically 20 minutes face-to-face)  [] EVAL (MOD) 75895 (typically 30 minutes face-to-face)  [] EVAL (HIGH) 62048 (typically 45 minutes face-to-face)  [] RE-EVAL     [x] FY(47260) x  2   [] IONTO  [] NMR (06576) x      [] VASO  [x] Manual (10641) x  1    [] Other:  [] TA x       [] Mech Traction (49210)  [] ES(attended) (47016)      [] ES (un) (14408):     GOALS:   Therapist goals for Patient:   Short Term Goals: To be achieved in: 2 weeks  1. Independent in HEP and progression per patient tolerance, in order to prevent re-injury. 2. Patient will have a decrease in pain to facilitate improvement in movement, function, and ADLs as indicated by Functional Deficits. Long Term Goals: To be achieved in: 4-6 weeks  1. Disability index score of 20% or less for the LEFS to assist with reaching prior level of function. 2. Patient will demonstrate increased AROM to L knee ext to 0 and flex to 125  to allow for proper joint functioning as indicated by patients Functional Deficits. 3. Patient will demonstrate an increase in Strength to good proximal hip strength and control, within 5lb HHD in LE to allow for proper functional mobility as indicated by patients Functional Deficits. 4. Patient will return to being able to ambulate and  ascend and descend stairs  without increased symptoms or restriction. 5. Pt able to return to walking program       Progression Towards Functional goals:  [x] Patient is progressing as expected towards functional goals listed. [] Progression is slowed due to complexities listed.   [] Progression has been slowed due to co-morbidities.   [] Plan just implemented, too soon to assess goals progression  [] Other:     ASSESSMENT:  Pt has improving LLe flexibility but still painful with ecc quad strengthening in CKC      Treatment/Activity Tolerance:  [x] Patient tolerated treatment well [] Patient limited by fatique  [] Patient limited by pain  [] Patient limited by other medical complications  [] Other:     Prognosis: [x] Good [] Fair  [] Poor    Patient Requires Follow-up: [x] Yes  [] No    PLAN:   [x] Continue per plan of care [] Alter current plan (see comments)  [] Plan of care initiated [] Hold pending MD visit [] Discharge    Electronically signed by: Samantha Osman, [id number]

## 2019-07-31 ENCOUNTER — HOSPITAL ENCOUNTER (OUTPATIENT)
Dept: PHYSICAL THERAPY | Age: 59
Setting detail: THERAPIES SERIES
Discharge: HOME | End: 2019-07-31
Payer: COMMERCIAL

## 2019-07-31 PROCEDURE — 97140 MANUAL THERAPY 1/> REGIONS: CPT | Performed by: PHYSICAL THERAPIST

## 2019-07-31 PROCEDURE — 97110 THERAPEUTIC EXERCISES: CPT | Performed by: PHYSICAL THERAPIST

## 2019-07-31 NOTE — FLOWSHEET NOTE
Up and Over                   Down                   Lateral                   Rotation                Squats  mini                      wall                     [facility]                 Lunges:  Lunge to Balance                       Balance to Lunge                       Walking                Knee Extension Bilat. Ecc.                                   Inv. Hamstring Curls Bilat. 30# 3x10 IH 30# 3x10 40# 3x10 50# 2x10 ball add                              Ecc. 30# 3x10 40# 2x10 ball add                              Inv.                Soleus Press Bilat.    IH 30# 3x10                            Ecc.                               Inv.                CC  10# Hip abd R/L 20x    10# Hip ext R/L 20x                                              Ladders                    Square                   Jump/Hop  Low                          Med.                          High                                                                        Modality Declined Declined Declined CP 15' CP 15'   Initials                             EP EP DB EP EP   Time spent one on one (workers comp)        Time spent with PT assistant

## 2019-07-31 NOTE — FLOWSHEET NOTE
Provided verbal/tactile cueing for activities related to strengthening, flexibility, endurance, ROM for improvements in LE, proximal hip, and core control with self care, mobility, lifting, ambulation.  [] (74248) Provided verbal/tactile cueing for activities related to improving balance, coordination, kinesthetic sense, posture, motor skill, proprioception  to assist with LE, proximal hip, and core control in self care, mobility, lifting, ambulation and eccentric single leg control.      NMR and Therapeutic Activities:    [x] (47881 or 89877) Provided verbal/tactile cueing for activities related to improving balance, coordination, kinesthetic sense, posture, motor skill, proprioception and motor activation to allow for proper function of core, proximal hip and LE with self care and ADLs  [] (44583) Gait Re-education- Provided training and instruction to the patient for proper LE, core and proximal hip recruitment and positioning and eccentric body weight control with ambulation re-education including up and down stairs     Home Exercise Program:    [x] (18270) Reviewed/Progressed HEP activities related to strengthening, flexibility, endurance, ROM of core, proximal hip and LE for functional self-care, mobility, lifting and ambulation/stair navigation   [] (92319)Reviewed/Progressed HEP activities related to improving balance, coordination, kinesthetic sense, posture, motor skill, proprioception of core, proximal hip and LE for self care, mobility, lifting, and ambulation/stair navigation      Manual Treatments:  PROM / STM / Oscillations-Mobs:  G-I, II, III, IV (PA's, Inf., Post.)  [x] (03928) Provided manual therapy to mobilize LE, proximal hip and/or LS spine soft tissue/joints for the purpose of modulating pain, promoting relaxation,  increasing ROM, reducing/eliminating soft tissue swelling/inflammation/restriction, improving soft tissue extensibility and allowing for proper ROM for normal function with self

## 2019-07-31 NOTE — DISCHARGE SUMMARY
did not return for follow up visits [] Home program/1 visit only   [] No subjective or objective improvement [] Plateaued   [] Patient was unable to adhere to the plan of care established at evaluation. [] Referred back to physician for re-evaluation and did not return.      [] Other:?       Electronically signed by:  Velvet Patel [id number]

## 2020-06-12 ENCOUNTER — TELEPHONE (OUTPATIENT)
Dept: ORTHOPEDIC SURGERY | Age: 60
End: 2020-06-12

## 2020-06-16 ENCOUNTER — OFFICE VISIT (OUTPATIENT)
Dept: ORTHOPEDIC SURGERY | Age: 60
End: 2020-06-16
Payer: COMMERCIAL

## 2020-06-16 VITALS
WEIGHT: 195.11 LBS | DIASTOLIC BLOOD PRESSURE: 80 MMHG | SYSTOLIC BLOOD PRESSURE: 128 MMHG | HEIGHT: 68 IN | BODY MASS INDEX: 29.57 KG/M2

## 2020-06-16 PROCEDURE — 99213 OFFICE O/P EST LOW 20 MIN: CPT | Performed by: PHYSICAL MEDICINE & REHABILITATION

## 2020-06-16 RX ORDER — LOSARTAN POTASSIUM AND HYDROCHLOROTHIAZIDE 12.5; 1 MG/1; MG/1
1 TABLET ORAL DAILY
COMMUNITY
Start: 2019-09-16

## 2020-06-16 NOTE — PROGRESS NOTES
Date    HTN (hypertension)     Prepatellar bursitis, right knee 10/8/2018      Past Surgical History:     Past Surgical History:   Procedure Laterality Date    CARDIAC SURGERY      svt ablation    ENDOMETRIAL ABLATION      KNEE ARTHROSCOPY      SEPTOPLASTY       Current Medications:     Current Outpatient Medications:     losartan-hydrochlorothiazide (HYZAAR) 100-12.5 MG per tablet, Take 1 tablet by mouth daily, Disp: , Rfl:     tolterodine (DETROL LA) 4 MG extended release capsule, TK 1 C PO D, Disp: , Rfl: 12    lisinopril (PRINIVIL;ZESTRIL) 5 MG tablet, TK 1 T PO D, Disp: , Rfl: 2  Allergies:  Morphine  Social History:    reports that she has never smoked. She has never used smokeless tobacco. She reports previous alcohol use. She reports that she does not use drugs. Family History:   History reviewed. No pertinent family history. REVIEW OF SYSTEMS:   CONSTITUTIONAL: Denies unexplained weight loss, fevers, chills or fatigue  NEUROLOGICAL: Denies unsteady gait or progressive weakness  MUSCULOSKELETAL: Denies joint swelling or redness  GI: Denies nausea, vomiting, diarrhea   : Denies bowel or bladder issues       PHYSICAL EXAM:    Vitals: Blood pressure 128/80, height 5' 8.11\" (1.73 m), weight 195 lb 1.7 oz (88.5 kg). GENERAL EXAM:  · General Apparence: Patient is adequately groomed with no evidence of malnutrition. · Psychiatric: Orientation: The patient is oriented to time, place and person. The patient's mood and affect are appropriate   · Vascular: Examination reveals no swelling and palpation reveals no tenderness in upper or lower extremities. Good capillary refill.    · The lymphatic examination of the neck, axillae and groin reveals all areas to be without enlargement or induration  · Sensation is intact without deficit in the upper and lower extremities to light touch and pinprick  · Coordination of the upper and lower extremities are normal.    LUMBAR/SACRAL EXAMINATION:  · Inspection: indications for (possible future) interventions. After considering the various options discussed, Alek Lin elected to pursue a course of treatment that includes the followin. Medications:  Avoid anti-inflammatories    2. PT:  Encouraged to continue with Home exercise program.    3. Further studies: No further studies. 4. Interventional:  Will setup for PRP for the left knee intra-articular injection. Risks, benefits and alternatives were discussed. 5. Follow up:  2-3 weeks      Juju Gamez was instructed to call the office if her symptoms worsen or if new symptoms appear prior to the next scheduled visit. She is specifically instructed to contact the office between now & her scheduled appointment if she has concerns related to her condition or if she needs assistance in scheduling the above tests. She is welcome to call for an appointment sooner if she has any additional concerns or questions. Padmini LUCAS, am scribing for Dr. Olivia Vaughn.  20 4:09 PM Padmini Bhatti. The physical examination was performed between the patient and Dr. Olivia Vaughn. All counseling during the appointment was performed between the patient and the provider. I, Dr. Olivia Vaughn, personally performed the services described in this documentation as scribed by Padmini Leal ATC in my presence and it is both accurate and complete. Safia Ellis. Neema Amos MD, ROBERT, [facility]  Board Certified in [address] [place] [address] Certified and Fellowship Trained in [facility] ([facility])             This dictation was performed with a verbal recognition program [facility]) and it was checked for errors. It is possible that there are still dictated errors within this office note. If so, please bring any errors to my attention for an addendum.  All efforts were made to ensure that this office

## 2020-06-16 NOTE — LETTER
Please schedule the following with: PROCEDURE IS CASH BASED PROFESSION FEE OF  $800    Date:   20 @ 9:00   Account: [de-identified]  Patient: Chas Gray    : 1960  Address:  [address] [address]    Phone (H):  [phone] (home)      ----------------------------------------------------------------------------------------------  Diagnosis:     ICD-10-CM    1. Primary osteoarthritis of left knee M17.12          Levels:Platelet Rich Plasma left knee intra-articular (Thomas)  CPT Codes PRP INJ    ----------------------------------------------------------------------------------------------  Injection [email or web address]    Attending Physician       Bere Hernández. Christine Hernandez MD.      ----------------------------------------------------------------------------------------------  DO NOT BILL HEALTH INSURANCE. $800 PROFESSIONAL FEE TO BE COLLECTED BY  7 DAYS PRIOR TO PROCEDURE DOS.     Comments or Special instructions: Jess maguire/ Thomas    · Infection control  · Tested positive for MRSA in past 12 months:  no  · Tested positive for MSSA \"staph infection\" in past 12 months: no  · Tested positive for VRE (Vancomycin Resistant Enterococci) in past 12 months:   no  · Currently on any antibiotics for an infection: no  · Anticoagulants:  · On a blood thinner:  no   · Any history of bleeding disorder: no   · Advanced Liver disease: no   · Advanced Renal disease: no   · Glaucoma: no   · Diabetes: no     Sedation:  No  -----------------------------------------------------------------------------------------------  Allergies   Allergen Reactions    Morphine

## 2020-07-10 ENCOUNTER — OFFICE VISIT (OUTPATIENT)
Dept: PRIMARY CARE CLINIC | Age: 60
End: 2020-07-10
Payer: COMMERCIAL

## 2020-07-10 PROCEDURE — 99211 OFF/OP EST MAY X REQ PHY/QHP: CPT | Performed by: FAMILY MEDICINE

## 2020-07-14 ENCOUNTER — HOSPITAL ENCOUNTER (OUTPATIENT)
Age: 60
Setting detail: OUTPATIENT SURGERY
Discharge: HOME | End: 2020-07-14
Attending: PHYSICAL MEDICINE & REHABILITATION | Admitting: PHYSICAL MEDICINE & REHABILITATION
Payer: COMMERCIAL

## 2020-07-14 VITALS
RESPIRATION RATE: 12 BRPM | HEIGHT: 67 IN | TEMPERATURE: 97.7 F | BODY MASS INDEX: 29.03 KG/M2 | HEART RATE: 80 BPM | SYSTOLIC BLOOD PRESSURE: 176 MMHG | WEIGHT: 185 LBS | OXYGEN SATURATION: 99 % | DIASTOLIC BLOOD PRESSURE: 107 MMHG

## 2020-07-14 PROCEDURE — 7100000010 HC PHASE II RECOVERY - FIRST 15 MIN: Performed by: PHYSICAL MEDICINE & REHABILITATION

## 2020-07-14 PROCEDURE — 2709999900 HC NON-CHARGEABLE SUPPLY: Performed by: PHYSICAL MEDICINE & REHABILITATION

## 2020-07-14 PROCEDURE — 7100000011 HC PHASE II RECOVERY - ADDTL 15 MIN: Performed by: PHYSICAL MEDICINE & REHABILITATION

## 2020-07-14 PROCEDURE — 2720000010 HC SURG SUPPLY STERILE: Performed by: PHYSICAL MEDICINE & REHABILITATION

## 2020-07-14 PROCEDURE — 3600000002 HC SURGERY LEVEL 2 BASE: Performed by: PHYSICAL MEDICINE & REHABILITATION

## 2020-07-14 ASSESSMENT — PAIN - FUNCTIONAL ASSESSMENT
PAIN_FUNCTIONAL_ASSESSMENT: PREVENTS OR INTERFERES SOME ACTIVE ACTIVITIES AND ADLS
PAIN_FUNCTIONAL_ASSESSMENT: 0-10

## 2020-07-14 ASSESSMENT — PAIN DESCRIPTION - DESCRIPTORS: DESCRIPTORS: STABBING

## 2020-07-14 NOTE — H&P
HISTORY AND PHYSICAL/PRE-SEDATION ASSESSMENT    Patient:  Peter Luna   :  1960  Medical Record No.:  [id number]   Date:  2020  Physician:  Gomez Meraz M.D. Facility: [facility]    HISTORY OF PRESENT ILLNESS:                 The patient is a 63 y.o. female whom presents with left knee pain. Review of the imaging and physical exam of the patient confirmed the pre-procedure diagnosis. After a thorough discussion of risks, benefits and alternatives informed consent was obtained. Past Medical History:   Past Medical History:   Diagnosis Date    HTN (hypertension)     Prepatellar bursitis, right knee 10/8/2018      Past Surgical History:     Past Surgical History:   Procedure Laterality Date    CARDIAC SURGERY      svt ablation    ENDOMETRIAL ABLATION      KNEE ARTHROSCOPY      SEPTOPLASTY       Current Medications:   Prior to Admission medications    Medication Sig Start Date End Date Taking? Authorizing Provider   losartan-hydrochlorothiazide (HYZAAR) 100-12.5 MG per tablet Take 1 tablet by mouth daily 19  Yes Historical Provider, MD   tolterodine (DETROL LA) 4 MG extended release capsule TK 1 C PO D 19  Yes Historical Provider, MD   lisinopril (PRINIVIL;ZESTRIL) 5 MG tablet TK 1 T PO D 16  Yes Historical Provider, MD     Allergies:  Morphine  Social History:    reports that she has never smoked. She has never used smokeless tobacco. She reports previous alcohol use. She reports that she does not use drugs. Family History:   History reviewed. No pertinent family history. Vitals: Blood pressure (!) 163/93, pulse 84, temperature 97.7 °F (36.5 °C), temperature source Temporal, resp. rate 16, height 5' 7\" (1.702 m), weight 185 lb (83.9 kg), SpO2 100 %. PHYSICAL EXAM:  HENT: Airway patent and reviewed  Cardiovascular: Normal rate, regular rhythm, normal heart sounds. Pulmonary/Chest: No wheezes. No rhonchi. No rales. Abdominal: Soft. Bowel sounds are normal. No distension. Extremities: Moves all extremities equally  Lumbar Spine: Painful range of motion, no midline tenderness       Diagnosis:Left Knee Osteoarthritis    Plan: Proceed with planned procedure    The patient was counseled at length about the risks of luli Covid-19 in the olimpia-operative and post-operative states including the recovery window of their procedure. The patient was made aware that luli Covid-19 after a surgical procedure may worsen their prognosis for recovering from the virus and lend to a higher morbidity and or mortality risk. The patient was given the options of postponing their procedure. All of the risks, benefits, and alternatives were discussed. The patient does wish to proceed with the procedure. ASA CLASS:         []   I. Normal, healthy adult           [x]   II.  Mild systemic disease            []   III. Severe systemic disease      Mallampati: Mallampati Class II - (soft palate, fauces & uvula are visible)      Sedation plan:   [x]  Local              []  Minimal                  []  General anesthesia    Patient's condition acceptable for planned procedure/sedation. Post Procedure Plan   Return to same level of care   ______________________     The risks and benefits as well as alternatives to the procedure have been discussed with the patient and or family. The patient and or next of kin understands and agrees to proceed.     James Espitia M.D.

## 2020-07-14 NOTE — PROGRESS NOTES
Pt states she has high BP and will take her medication when she gets home, pt asymptomatic, pt states she also can check her BP at home

## 2020-07-14 NOTE — OP NOTE
Patient:  Guerline Kunz  YOB: 1960  Medical Record #:  [id number]   Place:  [address], [place]  Date:  7/15/2020   Physician:  Chato Weber MD, ROBERT    Procedure:  Left knee Intra-articular Injection with fluoroscopy and Platelet Rich Plasma    Pre-Procedure Diagnosis: Primary osteoarthritis of the left knee Joint    Post-Procedure Diagnosis: Same    Sedation: None    EBL: None    Complications: None    Procedure Summary:    The patient was seen in the office for complaints of left knee pain. Review of the imaging and physical exam of the patient confirmed the pre-procedure diagnosis. After a thorough discussion of risks, benefits and alternatives informed consent was obtained. The patient was brought to the procedure suite and placed in the supine position. The skin overlying the left knee joint was prepped and draped in the usual sterile fashion. Fluoroscopy was utilized to identify the intra-articular space. Through anesthetized skin a 22 gauge spinal needle was advanced into the intra-articular space through a lateral inferior approach. A 10 ml solution of Platelet rich plasma (8 ml) and concentrated Platelet Poor Plasma was instilled. A Celling Rep was present. The needle was removed and a band-aid applied. The patient was transferred to the post-operative suite in stable condition.

## 2020-07-15 LAB
SARS-COV-2: NOT DETECTED
SOURCE: NORMAL

## 2020-07-28 ENCOUNTER — OFFICE VISIT (OUTPATIENT)
Dept: ORTHOPEDIC SURGERY | Age: 60
End: 2020-07-28
Payer: COMMERCIAL

## 2020-07-28 VITALS — TEMPERATURE: 97.8 F | RESPIRATION RATE: 14 BRPM | HEIGHT: 67 IN | BODY MASS INDEX: 29.03 KG/M2 | WEIGHT: 184.97 LBS

## 2020-07-28 PROCEDURE — 99213 OFFICE O/P EST LOW 20 MIN: CPT | Performed by: PHYSICAL MEDICINE & REHABILITATION

## 2020-07-28 NOTE — PROGRESS NOTES
Follow up: Mary Gamez  1960  [id number]         Chief Complaint   Patient presents with    Knee Pain     7/15: L KNEE PRP          HISTORY OF PRESENT ILLNESS:  Ms. Reyes Dave is a 62 y.o. female returns for a follow up visit for multiple medical problems. Her current presenting problems are   1. Primary osteoarthritis of left knee    2. Chondromalacia of knee, left    . As per information/history obtained from the PADT(patient assessment and documentation tool) - She complains of pain in the knees Left with radiation to the knees Left She rates the pain 0/10 and describes it as stiffness. Pain is made worse by: being on my feet all day. She denies side effects from the current pain regimen. Patient reports that since the last follow up visit the physical functioning is better, family/social relationships are better, mood is better and sleep patterns are better, and that the overall functioning is better. Patient denies neurological bowel or bladder. Ms Reyes Dave presents today following her PRP procedure for the left knee on 7/15/2020. She reports that her pain level has decreased significantly. Patients notes that she only experiences stiffness when she is on her feet all day. Rest is an alleviating factor. She does not present with any ambulatory aids or bracing. Associated signs and symptoms:   Neurogenic bowel or bladder symptoms:  no   Perceived weakness:  no   Difficulty walking:  no            Past medical, surgical, social and family history reviewed with the patient.  No pertinent relevant history  Past Medical History:   Past Medical History:   Diagnosis Date    HTN (hypertension)     Prepatellar bursitis, right knee 10/8/2018      Past Surgical History:     Past Surgical History:   Procedure Laterality Date    CARDIAC SURGERY      svt ablation    ENDOMETRIAL ABLATION      KNEE ARTHROSCOPY      AL ARTHROCENTESIS ASPIR&/INJ MAJOR JT/BURSA W/O US Left 7/14/2020    PLATELET RICH PLASMA LEFT KNEE INTRA ARTICULAR (CELING) performed by Lakisha Farias MD at [address] SEPTOPLASTY       Current Medications:     Current Outpatient Medications:     losartan-hydrochlorothiazide (HYZAAR) 100-12.5 MG per tablet, Take 1 tablet by mouth daily, Disp: , Rfl:     tolterodine (DETROL LA) 4 MG extended release capsule, TK 1 C PO D, Disp: , Rfl: 12    lisinopril (PRINIVIL;ZESTRIL) 5 MG tablet, TK 1 T PO D, Disp: , Rfl: 2  Allergies:  Morphine  Social History:    reports that she has never smoked. She has never used smokeless tobacco. She reports previous alcohol use. She reports that she does not use drugs. Family History:   Family History   Problem Relation Age of Onset    Osteoarthritis Father        REVIEW OF SYSTEMS:   CONSTITUTIONAL: Denies unexplained weight loss, fevers, chills or fatigue  NEUROLOGICAL: Denies unsteady gait or progressive weakness  MUSCULOSKELETAL: Denies joint swelling or redness  GI: Denies nausea, vomiting, diarrhea   : Denies bowel or bladder issues       PHYSICAL EXAM:    Vitals: Temperature 97.8 °F (36.6 °C), resp. rate 14, height 5' 7.01\" (1.702 m), weight 184 lb 15.5 oz (83.9 kg). GENERAL EXAM:  · General Apparence: Patient is adequately groomed with no evidence of malnutrition. · Psychiatric: Orientation: The patient is oriented to time, place and person. The patient's mood and affect are appropriate   · Vascular: Examination reveals no swelling and palpation reveals no tenderness in upper or lower extremities. Good capillary refill. · Sensation is intact without deficit in the upper and lower extremities to light touch and pinprick  · Coordination of the upper and lower extremities are normal.      LUMBAR/SACRAL EXAMINATION:  · Inspection: Local inspection shows no step-off or bruising. Lumbar alignment is normal. No instability is noted. · Palpation:   No evidence of tenderness at the midline.   Lumbar paraspinal tenderness: No tenderness to palpation of the lumbar paraspinals  Bursal tenderness No tenderness bilaterally  There is no paraspinal spasm. · Range of Motion: pain-free ROM  · Strength:   Strength testing is 5/5 in all muscle groups tested. · Skin: There are no rashes, ulcerations or lesions. · Reflexes: Reflexes are symmetrically 1+ at the patellar and ankle tendons. Clonus absent bilaterally at the feet. · Gait & station: normal, patient ambulates without assistance and no ataxia  · Additional Examinations:  · RIGHT LOWER EXTREMITY: Inspection/examination of the right lower extremity does not show any tenderness, deformity or injury. Range of motion is normal and pain-free. There is no gross instability. There are no rashes, ulcerations or lesions. Strength and tone are normal. No atrophy or abnormal movements are noted. + swelling noted   · LEFT LOWER EXTREMITY:  Inspection/examination of the left lower extremity does not show any tenderness, deformity or injury. Range of motion is normal and pain-free. There is no gross instability. There are no rashes, ulcerations or lesions. Strength and tone are normal. No atrophy or abnormal movements are noted. + swelling noted      Diagnostic Testing:    No new diagnostics  Results for orders placed or performed in visit on 07/10/20   Covid-19 Ambulatory   Result Value Ref Range    SARS-CoV-2 Not Detected Not Detected    Source NP swab      Impression:       1. Primary osteoarthritis of left knee    2. Chondromalacia of knee, left        Plan:  Clinical Course: Above diagnoses are improving     I discussed the diagnosis and the treatment options with Buddy Brought today. In Summary:  The various treatment options were outlined and discussed with [facility] Reckers including:  Conservative care options: physical therapy, ice, medications, bracing, and activity modification. The indications for therapeutic injections. The indications for additional imaging/laboratory studies.   The indications for (possible future) interventions. After considering the various options discussed, Vesna Lind elected to pursue a course of treatment that includes the followin. Medications:  Avoid anti-inflammatories at this time    2. PT:  Will resume HEP for the next 4 weeks from PT    3. Further studies: No further studies. 4. Interventional:  60% relief following PRP injection to the left knee intra-articular approach'    5. Follow up:  4-6 weeks      Vesna Lind was instructed to call the office if her symptoms worsen or if new symptoms appear prior to the next scheduled visit. She is specifically instructed to contact the office between now & her scheduled appointment if she has concerns related to her condition or if she needs assistance in scheduling the above tests. She is welcome to call for an appointment sooner if she has any additional concerns or questions. Cheyanne Zavala ATC, am scribing for and in the presence of Dr. Natalie Garcia.   20 10:20 AM Rui Littlejohn ATC    The physical examination was performed between the patient and Dr. Natalie Garcia. All counseling during the appointment was performed between the patient and provider. I, Dr. Natalee Nova. Roderick, personally performed the services described in this documentation as scribed by CHAYO Tijerina in my presence and it is both accurate and complete. Anne Appiah. Jose C Whitlock MD, ROBERT, [facility]  Board Certified in [address] [place] [address] Certified and Fellowship Trained in [facility] ([facility])             This dictation was performed with a verbal recognition program [facility]) and it was checked for errors. It is possible that there are still dictated errors within this office note. If so, please bring any errors to my attention for an addendum. All efforts were made to ensure that this office note is accurate.

## (undated) DEVICE — Device